# Patient Record
Sex: FEMALE | Race: WHITE | HISPANIC OR LATINO | Employment: FULL TIME | URBAN - METROPOLITAN AREA
[De-identification: names, ages, dates, MRNs, and addresses within clinical notes are randomized per-mention and may not be internally consistent; named-entity substitution may affect disease eponyms.]

---
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
## 2024-02-12 ENCOUNTER — OFFICE VISIT (OUTPATIENT)
Dept: OBGYN | Facility: CLINIC | Age: 19
End: 2024-02-12
Payer: COMMERCIAL

## 2024-02-12 ENCOUNTER — HOSPITAL ENCOUNTER (OUTPATIENT)
Facility: MEDICAL CENTER | Age: 19
End: 2024-02-12
Attending: OBSTETRICS & GYNECOLOGY
Payer: COMMERCIAL

## 2024-02-12 VITALS — WEIGHT: 146 LBS | SYSTOLIC BLOOD PRESSURE: 131 MMHG | DIASTOLIC BLOOD PRESSURE: 81 MMHG

## 2024-02-12 DIAGNOSIS — Z30.430 ENCOUNTER FOR IUD INSERTION: ICD-10-CM

## 2024-02-12 DIAGNOSIS — Z11.3 ROUTINE SCREENING FOR STI (SEXUALLY TRANSMITTED INFECTION): ICD-10-CM

## 2024-02-12 DIAGNOSIS — Z01.419 WOMEN'S ANNUAL ROUTINE GYNECOLOGICAL EXAMINATION: ICD-10-CM

## 2024-02-12 LAB
POCT INT CON NEG: NEGATIVE
POCT INT CON POS: POSITIVE
POCT URINE PREGNANCY TEST: NEGATIVE

## 2024-02-12 PROCEDURE — 87591 N.GONORRHOEAE DNA AMP PROB: CPT

## 2024-02-12 PROCEDURE — 3079F DIAST BP 80-89 MM HG: CPT | Performed by: OBSTETRICS & GYNECOLOGY

## 2024-02-12 PROCEDURE — 87491 CHLMYD TRACH DNA AMP PROBE: CPT

## 2024-02-12 PROCEDURE — 99385 PREV VISIT NEW AGE 18-39: CPT | Mod: 25 | Performed by: OBSTETRICS & GYNECOLOGY

## 2024-02-12 PROCEDURE — 3075F SYST BP GE 130 - 139MM HG: CPT | Performed by: OBSTETRICS & GYNECOLOGY

## 2024-02-12 PROCEDURE — 81025 URINE PREGNANCY TEST: CPT | Performed by: OBSTETRICS & GYNECOLOGY

## 2024-02-12 PROCEDURE — 58300 INSERT INTRAUTERINE DEVICE: CPT | Performed by: OBSTETRICS & GYNECOLOGY

## 2024-02-12 NOTE — PROCEDURES
IUD Insertion    Date/Time: 2/12/2024 2:30 PM    Performed by: Magdaleno Goss M.D.  Authorized by: Magdaleno Goss M.D.    Consent:     Consent obtained:  Written    Consent given by:  Patient    Procedure risks and benefits discussed: yes      Patient questions answered: yes      Patient agrees, verbalizes understanding, and wants to proceed: yes      Educational handouts given: yes      Instructions and paperwork completed: yes    Pre-procedure details:     Negative GC/chlamydia test: Collected with placement.      Negative urine pregnancy test: yes      Negative serum pregnancy test: no    Procedure:     Pelvic exam performed: yes      Sterile speculum placed in vagina: yes      Cervix visualized: yes      Cervix cleaned and prepped in sterile fashion: yes      Tenaculum applied to cervix: yes      Dilation needed: no      Uterus sounded: yes      Uterus sound depth (cm):  7    IUD inserted with no complications: yes      IUD type:  Mirena    Strings trimmed: yes    Post-procedure:     Patient tolerated procedure well: yes      Patient will follow up after next period: yes      Magdaleno Goss M.D.

## 2024-02-12 NOTE — PROGRESS NOTES
ANNUAL GYNECOLOGY VISIT    Chief Complaint  Gynecologic Exam (Annual )      Subjective  PHYLLIS KILPATRICK is a 18 y.o. female who presents today for Annual Exam.  She has been on combined oral contraception for the last 5 years and is interested in switching methods. After counseling, she would like to have the Mirena IUD placed. Consents were signed and this was placed in addition to the annual visit. She has not had unprotected sex in 2 weeks and had her period 3 days ago. Today the patient is counseled on procedure of IUD insertion. I discussed with the patient the risks of IUD including infection, risk of IUD expulsion, the risk of uterine perforation (1-2/1000, slightly higher while breastfeeding), risk of IUD migration or lost strings.  If the IUD does migrate the patient may require a separate procedure such as hysteroscopy or laparoscopy to remove or retrieve the migrated IUD. I also discussed the 0.1% risk of pregnancy with IUD use which coincides with an increased risk of ectopic pregnancy with IUD use.  We reviewed leaving the strings long enough to prevent disappearance however this may initially result in the possibility that partner can feel the IUD during intercourse; this typically resolves as the strings soften and tuck behind cervix. I also discussed the side effects of progestin-containing IUDs including decreased, irregular or absent menses and/or spotting.  All questions answered.  Informed consent is signed.  UPT negative. See separate procedure note.     Preventive Care   Immunization History   Administered Date(s) Administered    Dtap Vaccine 2004, 01/26/2005, 07/08/2005, 02/01/2006, 09/05/2008    Hepatitis A Vaccine, Ped/Adol 01/03/2007, 09/05/2008    Hepatitis B Vaccine Adolescent/Pediatric 2004, 2004, 01/26/2005, 09/05/2008    Hib Vaccine (Prp-d) - HISTORICAL DATA 2004, 01/26/2005, 07/08/2005, 02/01/2006    IPV 2004, 01/26/2005, 07/08/2005, 09/05/2008     Influenza (IM) Preservative Free - HISTORICAL DATA 10/19/2011    Influenza Seasonal Injectable - Historical Data 01/14/2013    Influenza Vaccine H1N1 - HISTORICAL DATA 11/12/2009, 12/10/2009    Influenza Vaccine Quad Inj (Pf) 10/05/2023    Influenza Vaccine Quad Nasal 12/17/2015    MMR Vaccine 02/01/2006, 10/06/2008    Meningococcal Conjugate Vaccine MCV4 (Menactra) 12/17/2015    Pneumococcal Vaccine (PCV7) - HISTORICAL DATA 2004, 01/26/2005, 07/08/2005, 10/06/2008    Tdap Vaccine 12/17/2015    Varicella Vaccine Live 02/01/2006, 10/06/2008     Last Mammogram: Not yet indicated    Gynecology History and ROS  Current Sexual Activity: Yes  History of sexually transmitted diseases?  No, has never been tested  Abnormal discharge? No  Current Contraception:  None,     Menstrual History  Patient's last menstrual period was 02/09/2024 (exact date).  Periods are regular  q 30 days, lasting 3 days.   Clots or heavy flow: No  Dysmenorrhea: No  Intermenstrual bleeding/spotting: No  Significant pain with periods:No  Bothersome PMS symptoms: No  Significant Pelvic Pain: No    Pap History  Last pap smear: Not yet indicated  History of moderate or severe dysplasia: No    Cancer Risk Assessement:  Family history of:   - Breast cancer: no   - Ovarian cancer: no   - Uterine cancer: no   - Colon cancer: no    Obstetric History  OB History   No obstetric history on file.       Past Medical History  History reviewed. No pertinent past medical history.    Past Surgical History  History reviewed. No pertinent surgical history.    Social History  Social History     Socioeconomic History    Marital status: Single     Spouse name: Not on file    Number of children: Not on file    Years of education: Not on file    Highest education level: Not on file   Occupational History    Not on file   Tobacco Use    Smoking status: Never    Smokeless tobacco: Never   Vaping Use    Vaping Use: Never used   Substance and Sexual Activity    Alcohol  use: Yes     Comment: Occ    Drug use: Never    Sexual activity: Yes     Partners: Male     Birth control/protection: None   Other Topics Concern    Not on file   Social History Narrative    Not on file     Social Determinants of Health     Financial Resource Strain: Not on file   Food Insecurity: Not on file   Transportation Needs: Not on file   Physical Activity: Not on file   Stress: Not on file   Social Connections: Not on file   Intimate Partner Violence: Not on file   Housing Stability: Not on file       Family History  Family History   Problem Relation Age of Onset    No Known Problems Mother     Leukemia Father        Home Medications  No current outpatient medications on file prior to visit.     No current facility-administered medications on file prior to visit.       Allergies/Reactions  Not on File    ROS  Positive ROS: none  Gen: no fevers or chills, no significant weight loss or gain, excessive fatigue  Respiratory:  no cough or dyspnea  Cardiac:  no chest pain, no palpitations, no syncope  Breast: no breast discharge, pain, lump or skin changes  GI:  no heartburn, no abdominal pain, no nausea or vomiting  Urinary: no dysuria, urgency, frequency, incontinence   Psych: no depression or anxiety  Neuro: no migraines with aura, fainting spells, numbness or tingling  Extremities: no joint pain, persistently swollen ankles, recurrent leg cramps      Physical Examination:  Vital Signs:   Vitals:    02/12/24 1344   BP: 131/81   BP Location: Right arm   Patient Position: Sitting   BP Cuff Size: Adult   Weight: 146 lb     There is no height or weight on file to calculate BMI.    Constitutional: The patient is well developed and well nourished.  Psychiatric: Patient is oriented to time place and person.   Skin: No rash observed.  Neck: Appears symmetric. Thyroid normal size  Respiratory: normal effort  Breast: Deferred  Abdomen: Soft, non-tender.  Pelvic Exam:     Vulva: external female genitalia are normal in  "appearance. No lesions    Urethra - no lesions, no erythema    Vagina: moist, pink, normal ruggae    Cervix: pink, smooth, no lesions, no CMT    Uterus - non-tender, normal size, shape, contour, mobile, anteverted    Ovaries: non-tender, no appreciable masses  Pap Smear performed:  no, annual G/C testing performed   Extremeties: Legs are symmetric and without tenderness. There is no edema present.    Labs/Imaging:  No results found for: \"HDL\", \"LDL\"  No components found for: \"A1C1\"  No results found for: \"WBC\", \"HGB\", \"HCT\"  No results found for: \"NA\", \"K\", \"CL\", \"CO2\", \"BUN\"      Assessment & Plan  PHYLLIS KILPATRICK is a 18 y.o. female who presents today for Annual Gyn Exam.     1. Women's annual routine gynecological examination  - Annual G/C testing performed  - She was counseled that she will enter into pap screening at age 20   - Per chart review it looks like she hasn't received the Gardisil vaccine, will ask her about this at her string check in one month  - By family history, she will start colon and breast cancer screening on the normal schedule     2. Routine screening for STI (sexually transmitted infection)  - Chlamydia/GC, PCR (Genital/Anal swab); Future    3. Encounter for IUD insertion  - POCT Pregnancy  - Consent for all Surgical, Special Diagnostic or Therapeutic Procedures  - levonorgestrel (Mirena) 20 MCG/DAY IUD 1 Each  - See separate procedure note      Return: Annually or PRN    Magdaleno Goss M.D.        "

## 2024-02-12 NOTE — PROGRESS NOTES
Patient here for annual exam.   LMP : 2/9/24 - Regular   BCM : None - Would like to discuss   Pt states she would like to discuss continuing the BC pills she has been on for the past 5 years or if she should change her BC.   UPT = Negative   Phone/Pharmacy verified

## 2024-02-13 LAB
C TRACH DNA GENITAL QL NAA+PROBE: NEGATIVE
N GONORRHOEA DNA GENITAL QL NAA+PROBE: NEGATIVE
SPECIMEN SOURCE: NORMAL

## 2024-07-10 ENCOUNTER — APPOINTMENT (OUTPATIENT)
Dept: URGENT CARE | Facility: CLINIC | Age: 19
End: 2024-07-10
Payer: COMMERCIAL

## 2024-07-11 SDOH — ECONOMIC STABILITY: HOUSING INSECURITY
IN THE LAST 12 MONTHS, WAS THERE A TIME WHEN YOU DID NOT HAVE A STEADY PLACE TO SLEEP OR SLEPT IN A SHELTER (INCLUDING NOW)?: NO

## 2024-07-11 SDOH — HEALTH STABILITY: PHYSICAL HEALTH: ON AVERAGE, HOW MANY DAYS PER WEEK DO YOU ENGAGE IN MODERATE TO STRENUOUS EXERCISE (LIKE A BRISK WALK)?: 5 DAYS

## 2024-07-11 SDOH — ECONOMIC STABILITY: TRANSPORTATION INSECURITY
IN THE PAST 12 MONTHS, HAS LACK OF RELIABLE TRANSPORTATION KEPT YOU FROM MEDICAL APPOINTMENTS, MEETINGS, WORK OR FROM GETTING THINGS NEEDED FOR DAILY LIVING?: NO

## 2024-07-11 SDOH — ECONOMIC STABILITY: TRANSPORTATION INSECURITY
IN THE PAST 12 MONTHS, HAS THE LACK OF TRANSPORTATION KEPT YOU FROM MEDICAL APPOINTMENTS OR FROM GETTING MEDICATIONS?: NO

## 2024-07-11 SDOH — ECONOMIC STABILITY: FOOD INSECURITY: WITHIN THE PAST 12 MONTHS, YOU WORRIED THAT YOUR FOOD WOULD RUN OUT BEFORE YOU GOT MONEY TO BUY MORE.: NEVER TRUE

## 2024-07-11 SDOH — HEALTH STABILITY: PHYSICAL HEALTH: ON AVERAGE, HOW MANY MINUTES DO YOU ENGAGE IN EXERCISE AT THIS LEVEL?: 60 MIN

## 2024-07-11 SDOH — ECONOMIC STABILITY: FOOD INSECURITY: WITHIN THE PAST 12 MONTHS, THE FOOD YOU BOUGHT JUST DIDN'T LAST AND YOU DIDN'T HAVE MONEY TO GET MORE.: NEVER TRUE

## 2024-07-11 SDOH — ECONOMIC STABILITY: INCOME INSECURITY: HOW HARD IS IT FOR YOU TO PAY FOR THE VERY BASICS LIKE FOOD, HOUSING, MEDICAL CARE, AND HEATING?: NOT VERY HARD

## 2024-07-11 SDOH — HEALTH STABILITY: MENTAL HEALTH
STRESS IS WHEN SOMEONE FEELS TENSE, NERVOUS, ANXIOUS, OR CAN'T SLEEP AT NIGHT BECAUSE THEIR MIND IS TROUBLED. HOW STRESSED ARE YOU?: NOT AT ALL

## 2024-07-11 SDOH — ECONOMIC STABILITY: TRANSPORTATION INSECURITY
IN THE PAST 12 MONTHS, HAS LACK OF TRANSPORTATION KEPT YOU FROM MEETINGS, WORK, OR FROM GETTING THINGS NEEDED FOR DAILY LIVING?: NO

## 2024-07-11 SDOH — ECONOMIC STABILITY: INCOME INSECURITY: IN THE LAST 12 MONTHS, WAS THERE A TIME WHEN YOU WERE NOT ABLE TO PAY THE MORTGAGE OR RENT ON TIME?: NO

## 2024-07-11 SDOH — ECONOMIC STABILITY: HOUSING INSECURITY: IN THE LAST 12 MONTHS, HOW MANY PLACES HAVE YOU LIVED?: 1

## 2024-07-11 ASSESSMENT — SOCIAL DETERMINANTS OF HEALTH (SDOH)
HOW OFTEN DO YOU ATTEND CHURCH OR RELIGIOUS SERVICES?: NEVER
HOW MANY DRINKS CONTAINING ALCOHOL DO YOU HAVE ON A TYPICAL DAY WHEN YOU ARE DRINKING: PATIENT DOES NOT DRINK
HOW HARD IS IT FOR YOU TO PAY FOR THE VERY BASICS LIKE FOOD, HOUSING, MEDICAL CARE, AND HEATING?: NOT VERY HARD
WITHIN THE PAST 12 MONTHS, YOU WORRIED THAT YOUR FOOD WOULD RUN OUT BEFORE YOU GOT THE MONEY TO BUY MORE: NEVER TRUE
HOW OFTEN DO YOU GET TOGETHER WITH FRIENDS OR RELATIVES?: MORE THAN THREE TIMES A WEEK
HOW OFTEN DO YOU ATTENT MEETINGS OF THE CLUB OR ORGANIZATION YOU BELONG TO?: PATIENT DECLINED
ARE YOU MARRIED, WIDOWED, DIVORCED, SEPARATED, NEVER MARRIED, OR LIVING WITH A PARTNER?: NEVER MARRIED
HOW OFTEN DO YOU GET TOGETHER WITH FRIENDS OR RELATIVES?: MORE THAN THREE TIMES A WEEK
IN THE PAST 12 MONTHS, HAS THE ELECTRIC, GAS, OIL, OR WATER COMPANY THREATENED TO SHUT OFF SERVICE IN YOUR HOME?: NO
HOW OFTEN DO YOU ATTEND CHURCH OR RELIGIOUS SERVICES?: NEVER
IN A TYPICAL WEEK, HOW MANY TIMES DO YOU TALK ON THE PHONE WITH FAMILY, FRIENDS, OR NEIGHBORS?: MORE THAN THREE TIMES A WEEK
ARE YOU MARRIED, WIDOWED, DIVORCED, SEPARATED, NEVER MARRIED, OR LIVING WITH A PARTNER?: NEVER MARRIED
HOW OFTEN DO YOU HAVE SIX OR MORE DRINKS ON ONE OCCASION: NEVER
HOW OFTEN DO YOU HAVE A DRINK CONTAINING ALCOHOL: NEVER
DO YOU BELONG TO ANY CLUBS OR ORGANIZATIONS SUCH AS CHURCH GROUPS UNIONS, FRATERNAL OR ATHLETIC GROUPS, OR SCHOOL GROUPS?: PATIENT DECLINED
HOW OFTEN DO YOU ATTENT MEETINGS OF THE CLUB OR ORGANIZATION YOU BELONG TO?: PATIENT DECLINED
IN A TYPICAL WEEK, HOW MANY TIMES DO YOU TALK ON THE PHONE WITH FAMILY, FRIENDS, OR NEIGHBORS?: MORE THAN THREE TIMES A WEEK
DO YOU BELONG TO ANY CLUBS OR ORGANIZATIONS SUCH AS CHURCH GROUPS UNIONS, FRATERNAL OR ATHLETIC GROUPS, OR SCHOOL GROUPS?: PATIENT DECLINED

## 2024-07-11 ASSESSMENT — LIFESTYLE VARIABLES
HOW OFTEN DO YOU HAVE SIX OR MORE DRINKS ON ONE OCCASION: NEVER
AUDIT-C TOTAL SCORE: 0
SKIP TO QUESTIONS 9-10: 1
HOW OFTEN DO YOU HAVE A DRINK CONTAINING ALCOHOL: NEVER
HOW MANY STANDARD DRINKS CONTAINING ALCOHOL DO YOU HAVE ON A TYPICAL DAY: PATIENT DOES NOT DRINK

## 2024-07-12 ENCOUNTER — OFFICE VISIT (OUTPATIENT)
Dept: MEDICAL GROUP | Facility: PHYSICIAN GROUP | Age: 19
End: 2024-07-12
Payer: COMMERCIAL

## 2024-07-12 VITALS
TEMPERATURE: 97 F | SYSTOLIC BLOOD PRESSURE: 118 MMHG | HEIGHT: 64 IN | HEART RATE: 68 BPM | DIASTOLIC BLOOD PRESSURE: 70 MMHG | OXYGEN SATURATION: 98 % | WEIGHT: 151 LBS | BODY MASS INDEX: 25.78 KG/M2

## 2024-07-12 DIAGNOSIS — Z76.89 ENCOUNTER TO ESTABLISH CARE: ICD-10-CM

## 2024-07-12 DIAGNOSIS — Z11.1 SCREENING-PULMONARY TB: ICD-10-CM

## 2024-07-12 DIAGNOSIS — Z00.00 WELLNESS EXAMINATION: ICD-10-CM

## 2024-07-12 DIAGNOSIS — Z02.89 ENCOUNTER FOR COMPLETION OF FORM WITH PATIENT: ICD-10-CM

## 2024-07-12 PROCEDURE — 99385 PREV VISIT NEW AGE 18-39: CPT | Performed by: STUDENT IN AN ORGANIZED HEALTH CARE EDUCATION/TRAINING PROGRAM

## 2024-07-12 PROCEDURE — 3078F DIAST BP <80 MM HG: CPT | Performed by: STUDENT IN AN ORGANIZED HEALTH CARE EDUCATION/TRAINING PROGRAM

## 2024-07-12 PROCEDURE — 3074F SYST BP LT 130 MM HG: CPT | Performed by: STUDENT IN AN ORGANIZED HEALTH CARE EDUCATION/TRAINING PROGRAM

## 2024-07-12 ASSESSMENT — PATIENT HEALTH QUESTIONNAIRE - PHQ9: CLINICAL INTERPRETATION OF PHQ2 SCORE: 0

## 2024-07-16 LAB
GAMMA INTERFERON BACKGROUND BLD IA-ACNC: 0 IU/ML
M TB IFN-G BLD-IMP: NEGATIVE
M TB IFN-G CD4+ BCKGRND COR BLD-ACNC: 0 IU/ML
M TB IFN-G CD4+CD8+ BCKGRND COR BLD-ACNC: 0 IU/ML
MITOGEN IGNF BCKGRD COR BLD-ACNC: >10 IU/ML
QUANTIFERON INCUBATION 182889: NORMAL
SERVICE CMNT-IMP: NORMAL

## 2024-08-21 ENCOUNTER — EH NON-PROVIDER (OUTPATIENT)
Dept: OCCUPATIONAL MEDICINE | Facility: CLINIC | Age: 19
End: 2024-08-21

## 2024-08-21 PROCEDURE — 94375 RESPIRATORY FLOW VOLUME LOOP: CPT | Performed by: PREVENTIVE MEDICINE

## 2024-09-10 ENCOUNTER — OFFICE VISIT (OUTPATIENT)
Dept: URGENT CARE | Facility: PHYSICIAN GROUP | Age: 19
End: 2024-09-10
Payer: COMMERCIAL

## 2024-09-10 VITALS
HEART RATE: 86 BPM | RESPIRATION RATE: 15 BRPM | TEMPERATURE: 97 F | BODY MASS INDEX: 26.89 KG/M2 | OXYGEN SATURATION: 98 % | HEIGHT: 64 IN | SYSTOLIC BLOOD PRESSURE: 118 MMHG | DIASTOLIC BLOOD PRESSURE: 70 MMHG | WEIGHT: 157.52 LBS

## 2024-09-10 DIAGNOSIS — S01.112A LACERATION OF LEFT EYEBROW, INITIAL ENCOUNTER: ICD-10-CM

## 2024-09-10 PROCEDURE — 3074F SYST BP LT 130 MM HG: CPT | Performed by: PHYSICIAN ASSISTANT

## 2024-09-10 PROCEDURE — 3078F DIAST BP <80 MM HG: CPT | Performed by: PHYSICIAN ASSISTANT

## 2024-09-10 PROCEDURE — 12011 RPR F/E/E/N/L/M 2.5 CM/<: CPT | Performed by: PHYSICIAN ASSISTANT

## 2024-09-11 NOTE — PROGRESS NOTES
"Subjective:   Erna Roe is a 19 y.o. female who presents for Facial Laceration (Hit her head on with her car door, cut on left eye brow x happened 20 minutes ago.)  This is a very pleasant 19-year-old female who presents with laceration to the left eyebrow.  States this occurred 20 minutes ago when her car door struck her in the head.  She did not lose consciousness.  She denies headache.  She did have bleeding but was able to control this.  Tdap 2015        Medications:  This patient does not have an active medication from one of the medication groupers.    Allergies:             Patient has no known allergies.    Surgical History:       No past surgical history on file.    Past Social Hx:  Erna Roe  reports that she has never smoked. She has never used smokeless tobacco. She reports that she does not currently use alcohol. She reports that she does not use drugs.     Past Family Hx:   Erna Roe family history includes Diabetes in her father; Leukemia in her father; No Known Problems in her mother.       Problem list, medications, and allergies reviewed by myself today in Epic.     Objective:     /70 (BP Location: Right arm, Patient Position: Sitting, BP Cuff Size: Adult)   Pulse 86   Temp 36.1 °C (97 °F) (Temporal)   Resp 15   Ht 1.626 m (5' 4\")   Wt 71.5 kg (157 lb 8.3 oz)   SpO2 98%   BMI 27.04 kg/m²     Physical Exam  Vitals and nursing note reviewed.   Constitutional:       General: She is not in acute distress.     Appearance: Normal appearance. She is not ill-appearing.   HENT:      Head: Normocephalic.        Comments: Approximately 1 cm laceration extending vertically across the left eyebrow.  Wound edges reasonably well-approximated.  No bony exposure.  Eyes:      Extraocular Movements: Extraocular movements intact.      Pupils: Pupils are equal, round, and reactive to light.   Cardiovascular:      Rate and Rhythm: Normal rate.   Pulmonary:      Effort: " Pulmonary effort is normal.   Skin:     General: Skin is warm.      Findings: No rash.   Neurological:      Mental Status: She is alert and oriented to person, place, and time.   Psychiatric:         Thought Content: Thought content normal.         Judgment: Judgment normal.         Procedure: Laceration Repair  Risks including bleeding, nerve damage, infection, and poor cosmetic outcome discussed. Benefits and alternatives discussed.   Clean technique with sterile instruments and suture used  Local anesthesia with 1% lidocaine  Wound was copiously irrigated  No foreign bodies were seen or felt.  Closed with # 3 6 -0 Nylon interrupted sutures with good wound approximation  Polysporin and dressing placed  Patient tolerated well.  Patient instructed to return in 4-5 days for suture removal      Assessment/Plan:     Diagnosis and Associated Orders:     1. Laceration of left eyebrow, initial encounter        Comments/MDM:  Laceration repaired without incident.  Advised to keep wound clean and dry x 24 hours.  May otherwise wash with mild soap and water.  Return in 3 to 5 days for suture removal.  Return precautions discussed.  I personally reviewed prior external notes and test results pertinent to today's visit. Supportive care, natural history, differential diagnoses, and indications for immediate follow-up discussed. Return to clinic or go to ED if symptoms worsen or persist.  Red flag symptoms discussed.  Patient/Parent/Guardian voices understanding. Follow-up with your primary care provider in 3-5 days.  All side effects of medication discussed including allergic response, GI upset, tendon injury, rash, sedation etc    Please note that this dictation was created using voice recognition software. I have made a reasonable attempt to correct obvious errors, but I expect that there are errors of grammar and possibly content that I did not discover before finalizing the note.    This note was electronically signed by  Vibha Sanchez PA-C

## 2024-09-18 ENCOUNTER — OFFICE VISIT (OUTPATIENT)
Dept: MEDICAL GROUP | Facility: PHYSICIAN GROUP | Age: 19
End: 2024-09-18
Payer: COMMERCIAL

## 2024-09-18 VITALS
OXYGEN SATURATION: 98 % | WEIGHT: 157 LBS | SYSTOLIC BLOOD PRESSURE: 118 MMHG | HEIGHT: 64 IN | TEMPERATURE: 97 F | HEART RATE: 65 BPM | DIASTOLIC BLOOD PRESSURE: 72 MMHG | BODY MASS INDEX: 26.8 KG/M2

## 2024-09-18 DIAGNOSIS — S01.112S LACERATION OF LEFT EYEBROW, SEQUELA: ICD-10-CM

## 2024-09-18 DIAGNOSIS — Z48.02 VISIT FOR SUTURE REMOVAL: ICD-10-CM

## 2024-09-18 PROCEDURE — 3074F SYST BP LT 130 MM HG: CPT | Performed by: STUDENT IN AN ORGANIZED HEALTH CARE EDUCATION/TRAINING PROGRAM

## 2024-09-18 PROCEDURE — 99212 OFFICE O/P EST SF 10 MIN: CPT | Performed by: STUDENT IN AN ORGANIZED HEALTH CARE EDUCATION/TRAINING PROGRAM

## 2024-09-18 PROCEDURE — 3078F DIAST BP <80 MM HG: CPT | Performed by: STUDENT IN AN ORGANIZED HEALTH CARE EDUCATION/TRAINING PROGRAM

## 2024-09-18 ASSESSMENT — ENCOUNTER SYMPTOMS
FEVER: 0
SHORTNESS OF BREATH: 0
PALPITATIONS: 0
CHILLS: 0

## 2024-09-18 NOTE — PROGRESS NOTES
"Subjective:   Verbal consent was acquired by the patient to use [employer] ambient listening note generation during this visit Yes     CC: Suture removal    History of Present Illness  Ms. Roe is a pleasant 20 yo who presents today for suture removal.     She sustained a deep cut to her eyebrow last Tuesday, which was subsequently sutured at an urgent care facility. She reports no pain associated with the wound.    There are no problems to display for this patient.      Health Maintenance: Completed    ROS:  Review of Systems   Constitutional:  Negative for chills and fever.   Respiratory:  Negative for shortness of breath.    Cardiovascular:  Negative for chest pain and palpitations.       Objective:     Exam:  /72 (BP Location: Right arm, Patient Position: Sitting, BP Cuff Size: Adult)   Pulse 65   Temp 36.1 °C (97 °F) (Temporal)   Ht 1.626 m (5' 4\")   Wt 71.2 kg (157 lb)   SpO2 98%   BMI 26.95 kg/m²  Body mass index is 26.95 kg/m².    Physical Exam  Constitutional:       Appearance: Normal appearance.   Eyes:      Extraocular Movements: Extraocular movements intact.   Skin:     Comments: 3 sutures removed from the left eyebrow. Laceration site healing well with minimal scabbing   Neurological:      Mental Status: She is alert.   Psychiatric:         Mood and Affect: Mood normal.             Assessment & Plan:     19 y.o. female with the following -     1. Laceration of left eyebrow, sequela  2. Visit for suture removal  Patient sustained a laceration of the left eyebrow on 9/10/2024.  Patient was seen at urgent care and had three 6-0 nylon interrupted sutures placed.  Today all 3 sutures were removed.  Patient was advised to apply Vaseline to the area to help with dryness of scabbing.          Return if symptoms worsen or fail to improve.    Please note that this dictation was created using voice recognition software. I have made every reasonable attempt to correct obvious errors, but I expect " that there are errors of grammar and possibly content that I did not discover before finalizing the note.

## 2024-10-03 ENCOUNTER — IMMUNIZATION (OUTPATIENT)
Dept: OCCUPATIONAL MEDICINE | Facility: CLINIC | Age: 19
End: 2024-10-03

## 2024-10-03 DIAGNOSIS — Z23 NEED FOR VACCINATION: Primary | ICD-10-CM

## 2024-10-03 PROCEDURE — 90656 IIV3 VACC NO PRSV 0.5 ML IM: CPT | Performed by: PREVENTIVE MEDICINE

## 2025-01-09 ENCOUNTER — APPOINTMENT (OUTPATIENT)
Dept: URGENT CARE | Facility: PHYSICIAN GROUP | Age: 20
End: 2025-01-09
Payer: COMMERCIAL